# Patient Record
Sex: MALE | Race: ASIAN | NOT HISPANIC OR LATINO | URBAN - METROPOLITAN AREA
[De-identification: names, ages, dates, MRNs, and addresses within clinical notes are randomized per-mention and may not be internally consistent; named-entity substitution may affect disease eponyms.]

---

## 2020-12-30 ENCOUNTER — INPATIENT (INPATIENT)
Facility: HOSPITAL | Age: 40
LOS: 0 days | Discharge: HOME | DRG: 863 | End: 2020-12-31
Attending: ORTHOPAEDIC SURGERY | Admitting: ORTHOPAEDIC SURGERY
Payer: COMMERCIAL

## 2020-12-30 VITALS
HEART RATE: 79 BPM | RESPIRATION RATE: 16 BRPM | DIASTOLIC BLOOD PRESSURE: 91 MMHG | OXYGEN SATURATION: 100 % | TEMPERATURE: 98 F | SYSTOLIC BLOOD PRESSURE: 140 MMHG | WEIGHT: 167.99 LBS

## 2020-12-30 DIAGNOSIS — T81.9XXA UNSPECIFIED COMPLICATION OF PROCEDURE, INITIAL ENCOUNTER: ICD-10-CM

## 2020-12-30 LAB
ALBUMIN SERPL ELPH-MCNC: 4.1 G/DL — SIGNIFICANT CHANGE UP (ref 3.3–5)
ALP SERPL-CCNC: 72 U/L — SIGNIFICANT CHANGE UP (ref 40–120)
ALT FLD-CCNC: 57 U/L — HIGH (ref 10–45)
ANION GAP SERPL CALC-SCNC: 9 MMOL/L — SIGNIFICANT CHANGE UP (ref 5–17)
APTT BLD: 33.9 SEC — SIGNIFICANT CHANGE UP (ref 27.5–35.5)
AST SERPL-CCNC: 46 U/L — HIGH (ref 10–40)
BASOPHILS # BLD AUTO: 0.04 K/UL — SIGNIFICANT CHANGE UP (ref 0–0.2)
BASOPHILS NFR BLD AUTO: 0.6 % — SIGNIFICANT CHANGE UP (ref 0–2)
BILIRUB SERPL-MCNC: 0.3 MG/DL — SIGNIFICANT CHANGE UP (ref 0.2–1.2)
BUN SERPL-MCNC: 13 MG/DL — SIGNIFICANT CHANGE UP (ref 7–23)
CALCIUM SERPL-MCNC: 9.4 MG/DL — SIGNIFICANT CHANGE UP (ref 8.4–10.5)
CHLORIDE SERPL-SCNC: 102 MMOL/L — SIGNIFICANT CHANGE UP (ref 96–108)
CO2 SERPL-SCNC: 29 MMOL/L — SIGNIFICANT CHANGE UP (ref 22–31)
CREAT SERPL-MCNC: 1 MG/DL — SIGNIFICANT CHANGE UP (ref 0.5–1.3)
EOSINOPHIL # BLD AUTO: 0.08 K/UL — SIGNIFICANT CHANGE UP (ref 0–0.5)
EOSINOPHIL NFR BLD AUTO: 1.2 % — SIGNIFICANT CHANGE UP (ref 0–6)
GLUCOSE SERPL-MCNC: 98 MG/DL — SIGNIFICANT CHANGE UP (ref 70–99)
HCT VFR BLD CALC: 42.2 % — SIGNIFICANT CHANGE UP (ref 39–50)
HGB BLD-MCNC: 13.5 G/DL — SIGNIFICANT CHANGE UP (ref 13–17)
IMM GRANULOCYTES NFR BLD AUTO: 0.9 % — SIGNIFICANT CHANGE UP (ref 0–1.5)
INR BLD: 1.04 — SIGNIFICANT CHANGE UP (ref 0.88–1.16)
LACTATE SERPL-SCNC: 2.1 MMOL/L — HIGH (ref 0.5–2)
LYMPHOCYTES # BLD AUTO: 1.98 K/UL — SIGNIFICANT CHANGE UP (ref 1–3.3)
LYMPHOCYTES # BLD AUTO: 29 % — SIGNIFICANT CHANGE UP (ref 13–44)
MCHC RBC-ENTMCNC: 27.8 PG — SIGNIFICANT CHANGE UP (ref 27–34)
MCHC RBC-ENTMCNC: 32 GM/DL — SIGNIFICANT CHANGE UP (ref 32–36)
MCV RBC AUTO: 86.8 FL — SIGNIFICANT CHANGE UP (ref 80–100)
MONOCYTES # BLD AUTO: 0.49 K/UL — SIGNIFICANT CHANGE UP (ref 0–0.9)
MONOCYTES NFR BLD AUTO: 7.2 % — SIGNIFICANT CHANGE UP (ref 2–14)
NEUTROPHILS # BLD AUTO: 4.18 K/UL — SIGNIFICANT CHANGE UP (ref 1.8–7.4)
NEUTROPHILS NFR BLD AUTO: 61.1 % — SIGNIFICANT CHANGE UP (ref 43–77)
NRBC # BLD: 0 /100 WBCS — SIGNIFICANT CHANGE UP (ref 0–0)
PLATELET # BLD AUTO: 286 K/UL — SIGNIFICANT CHANGE UP (ref 150–400)
POTASSIUM SERPL-MCNC: 4.6 MMOL/L — SIGNIFICANT CHANGE UP (ref 3.5–5.3)
POTASSIUM SERPL-SCNC: 4.6 MMOL/L — SIGNIFICANT CHANGE UP (ref 3.5–5.3)
PROT SERPL-MCNC: 7.6 G/DL — SIGNIFICANT CHANGE UP (ref 6–8.3)
PROTHROM AB SERPL-ACNC: 12.5 SEC — SIGNIFICANT CHANGE UP (ref 10.6–13.6)
RBC # BLD: 4.86 M/UL — SIGNIFICANT CHANGE UP (ref 4.2–5.8)
RBC # FLD: 13.2 % — SIGNIFICANT CHANGE UP (ref 10.3–14.5)
SARS-COV-2 RNA SPEC QL NAA+PROBE: SIGNIFICANT CHANGE UP
SODIUM SERPL-SCNC: 140 MMOL/L — SIGNIFICANT CHANGE UP (ref 135–145)
WBC # BLD: 6.83 K/UL — SIGNIFICANT CHANGE UP (ref 3.8–10.5)
WBC # FLD AUTO: 6.83 K/UL — SIGNIFICANT CHANGE UP (ref 3.8–10.5)

## 2020-12-30 PROCEDURE — 99222 1ST HOSP IP/OBS MODERATE 55: CPT

## 2020-12-30 PROCEDURE — 99254 IP/OBS CNSLTJ NEW/EST MOD 60: CPT

## 2020-12-30 PROCEDURE — 99285 EMERGENCY DEPT VISIT HI MDM: CPT

## 2020-12-30 PROCEDURE — 99221 1ST HOSP IP/OBS SF/LOW 40: CPT

## 2020-12-30 RX ORDER — CEFAZOLIN SODIUM 1 G
2000 VIAL (EA) INJECTION EVERY 8 HOURS
Refills: 0 | Status: DISCONTINUED | OUTPATIENT
Start: 2020-12-30 | End: 2020-12-31

## 2020-12-30 RX ORDER — ACETAMINOPHEN 500 MG
650 TABLET ORAL EVERY 6 HOURS
Refills: 0 | Status: DISCONTINUED | OUTPATIENT
Start: 2020-12-30 | End: 2020-12-31

## 2020-12-30 RX ORDER — SENNA PLUS 8.6 MG/1
2 TABLET ORAL AT BEDTIME
Refills: 0 | Status: DISCONTINUED | OUTPATIENT
Start: 2020-12-30 | End: 2020-12-31

## 2020-12-30 RX ORDER — CEFAZOLIN SODIUM 1 G
2000 VIAL (EA) INJECTION ONCE
Refills: 0 | Status: COMPLETED | OUTPATIENT
Start: 2020-12-30 | End: 2020-12-30

## 2020-12-30 RX ORDER — POLYETHYLENE GLYCOL 3350 17 G/17G
17 POWDER, FOR SOLUTION ORAL DAILY
Refills: 0 | Status: DISCONTINUED | OUTPATIENT
Start: 2020-12-30 | End: 2020-12-31

## 2020-12-30 RX ORDER — CEFAZOLIN SODIUM 1 G
VIAL (EA) INJECTION
Refills: 0 | Status: DISCONTINUED | OUTPATIENT
Start: 2020-12-30 | End: 2020-12-31

## 2020-12-30 RX ORDER — SODIUM CHLORIDE 9 MG/ML
1000 INJECTION INTRAMUSCULAR; INTRAVENOUS; SUBCUTANEOUS ONCE
Refills: 0 | Status: COMPLETED | OUTPATIENT
Start: 2020-12-30 | End: 2020-12-30

## 2020-12-30 RX ORDER — OXYCODONE AND ACETAMINOPHEN 5; 325 MG/1; MG/1
1 TABLET ORAL EVERY 4 HOURS
Refills: 0 | Status: DISCONTINUED | OUTPATIENT
Start: 2020-12-30 | End: 2020-12-31

## 2020-12-30 RX ORDER — INFLUENZA VIRUS VACCINE 15; 15; 15; 15 UG/.5ML; UG/.5ML; UG/.5ML; UG/.5ML
0.5 SUSPENSION INTRAMUSCULAR ONCE
Refills: 0 | Status: DISCONTINUED | OUTPATIENT
Start: 2020-12-30 | End: 2020-12-31

## 2020-12-30 RX ADMIN — OXYCODONE AND ACETAMINOPHEN 1 TABLET(S): 5; 325 TABLET ORAL at 22:27

## 2020-12-30 RX ADMIN — OXYCODONE AND ACETAMINOPHEN 1 TABLET(S): 5; 325 TABLET ORAL at 21:27

## 2020-12-30 RX ADMIN — Medication 100 MILLIGRAM(S): at 18:10

## 2020-12-30 RX ADMIN — SODIUM CHLORIDE 1000 MILLILITER(S): 9 INJECTION INTRAMUSCULAR; INTRAVENOUS; SUBCUTANEOUS at 15:10

## 2020-12-30 NOTE — CONSULT NOTE ADULT - ASSESSMENT
41 yo male with hx L biceps tendon rupture s/p repair 8/2020, re-op 9/2020, then with development of LUE redness and swelling and pain earlier this month, found to have wound dehiscence c/w SSI s/p washout 12/23/20 (OR cx with MSSA).  - check baseline ESR and CRP  - start cefazolin 2g IV q8h while in house; anticipate transition back to PO cephalexin upon discharge    ID Team 2

## 2020-12-30 NOTE — ED PROVIDER NOTE - CLINICAL SUMMARY MEDICAL DECISION MAKING FREE TEXT BOX
pt had biceps surg that was complicated by wound dehiscence and abscess formation - I&D done and cultures growing bacteria (ortho team has sensitivity report), wound vac in place, plan is to admit to ortho service - no abx to be given in ED, admission labs sent

## 2020-12-30 NOTE — H&P ADULT - NSHPPHYSICALEXAM_GEN_ALL_CORE
PE:   Alert, oriented, comfortable on stretcher. Pleasant. Cooperative.  Left shoulder ANMOL dressing in place and maintaining suction. No drainage. No warmth or erythema.   Did not test left shoulder ROM  or motor function due to  surgery today and anesthesia block in place.   Decreased sensation to LUE due to block from surgery  Skin warm and well perfused. cap refill brisk. Radial pulses palpable

## 2020-12-30 NOTE — H&P ADULT - NSHPLABSRESULTS_GEN_ALL_CORE
Will add on ESR, CRP to labs from ER per ID recs  +MSSA Preliminary cultures from outside hospital OR

## 2020-12-30 NOTE — H&P ADULT - HISTORY OF PRESENT ILLNESS
40M with no PMHx presents to ER after revision biceps tenodesis and I&D yesterday. Pt had biceps tendon rupture with repair in August 2020 with a revision surgery after re-injury in September 2020. Pt had wound dehiscencse with redness and swelling to E which was treated with wash out 1 week ago. He has been taking Cefalexin since surgery and presented for another wash out today where he was found to have continued wound dehiscence. Pt was sent to ER after surgery for formal antibiotic recs as last week's cultures are growing Staph Aureus.   Denies fever, chills, increased pain, HA, diaphoresis, CP, SOB, recent illness. No issues with antibiotic.

## 2020-12-30 NOTE — H&P ADULT - PROBLEM SELECTOR PLAN 1
Admit to Orthopaedic Service.  ID consulted for ID recs given first I&D results show +MSSA on prelim report  Appreciate ID recs, will start 2gm IV Cefalexin q8 then transition to oral Keflex upon discharge  ESR, CRP added on  Returned labs reviewed  Pain control  Continue sling  OOB as tolerated   Discussed with Dr. Posadas who agrees with plan

## 2020-12-30 NOTE — ED PROVIDER NOTE - ATTENDING CONTRIBUTION TO CARE
39 yo m presents to ED at orthopedist request for admission secondary to concern for post operative infection.  He has wound vac in place to left bicep following bicep/shoulder soft tissue repair.  Ortho requesting admission without abx as they would like to consult with ID prior to abx administration.  He is not on outpatient abx.  On exam, patient is non toxic.  HRRR, lungs clear.  Wound vac in place to LUE without drainage in collecting system.  Will obtain basic labs, admit to ortho.

## 2020-12-30 NOTE — CONSULT NOTE ADULT - SUBJECTIVE AND OBJECTIVE BOX
HPI: 41 yo male with hx L biceps tendon rupture s/p repair 8/2020, re-op 9/2020, then with development of LUE redness and swelling and pain earlier this month, found to have wound dehiscence c/w SSI s/p washout 12/23/20 (OR cx with MSSA); he has been on cephalexin (thrice daily) since. Today underwent wound vac placement; additional cultures were sent. He overall feels better in the last week. No issues with antibiotic. No fever or chills.        PAST MEDICAL & SURGICAL HISTORY:        REVIEW OF SYSTEMS:    General:	 no weakness; no fevers, no chills  Skin/Breast: no rash  Respiratory and Thorax: no SOB, no cough  Cardiovascular:	No chest pain  Gastrointestinal:	 no nausea, vomiting , diarrhea  Genitourinary:	no dysuria, no difficulty urinating, no hematuria  Musculoskeletal:	no weakness, no joint swelling/pain  Neurological:	no focal weakness/numbness  Endocrine:	no polyuria, no polydipsia      ANTIBIOTICS:  MEDICATIONS  (STANDING):  influenza   Vaccine 0.5 milliLiter(s) IntraMuscular once  senna 2 Tablet(s) Oral at bedtime    MEDICATIONS  (PRN):  acetaminophen   Tablet .. 650 milliGRAM(s) Oral every 6 hours PRN Temp greater or equal to 38C (100.4F), Mild Pain (1 - 3)  oxycodone    5 mG/acetaminophen 325 mG 1 Tablet(s) Oral every 4 hours PRN Severe Pain (7 - 10)  polyethylene glycol 3350 17 Gram(s) Oral daily PRN Constipation      Allergies    No Known Allergies    Intolerances        SOCIAL HISTORY:    FAMILY HISTORY:      Vital Signs Last 24 Hrs  T(C): 36.6 (30 Dec 2020 15:03), Max: 36.7 (30 Dec 2020 13:45)  T(F): 97.9 (30 Dec 2020 15:03), Max: 98.1 (30 Dec 2020 13:45)  HR: 68 (30 Dec 2020 15:03) (68 - 79)  BP: 127/86 (30 Dec 2020 15:03) (127/86 - 140/91)  BP(mean): --  RR: 18 (30 Dec 2020 15:03) (16 - 18)  SpO2: 99% (30 Dec 2020 15:03) (99% - 100%)    PHYSICAL EXAM:  Constitutional:Well-developed, well nourished  Eyes:GAVINO, EOMI  Ear/Nose/Throat: no oral lesion, no sinus tenderness on percussion	  Neck:no JVD, no lymphadenopathy, supple  Respiratory: CTA matt  Cardiovascular: S1S2 RRR, no murmurs  Gastrointestinal:soft, (+) BS, no HSM  Extremities: L biceps dressing; LUE sling   Vascular: DP Pulse:	right normal; left normal            LABS:                        13.5   6.83  )-----------( 286      ( 30 Dec 2020 14:18 )             42.2     12-30    140  |  102  |  13  ----------------------------<  98  4.6   |  29  |  1.00    Ca    9.4      30 Dec 2020 14:18    TPro  7.6  /  Alb  4.1  /  TBili  0.3  /  DBili  x   /  AST  46<H>  /  ALT  57<H>  /  AlkPhos  72  12-30    PT/INR - ( 30 Dec 2020 14:18 )   PT: 12.5 sec;   INR: 1.04          PTT - ( 30 Dec 2020 14:18 )  PTT:33.9 sec      MICROBIOLOGY: outside culture of LUE wound MSSA  RADIOLOGY & ADDITIONAL STUDIES: none

## 2020-12-30 NOTE — H&P ADULT - ASSESSMENT
40M with surgical site infection s/p revision biceps tenodesis left shoulder x 2 now s/p 2 shoulder wash outs with preliminary cultures growing +MSSA

## 2020-12-30 NOTE — ED ADULT NURSE NOTE - OBJECTIVE STATEMENT
Patient is a 40y male sent in by surgeon for post op infection. Pt had surgery on left bicep one hour ago. Pt states, " I had surgery on my shoulder and bicep in August and September. I had a surgery on the bicep last week and today. I was sent in because they said I have a wound infection." Pt endorses the cyst being drained last week in surgery and the culture from that surgery showed infection. Pt reports taking oxycodone and cephalexin since surgery last week. Pt states, "My surgery today was just to close and clean the wound." No significant PMH. Original shoulder injury was from lifting weights. Pt denies any pain at this time. Pt denies chest pain, SOB, N/V, dizziness, fever, chills. Strong peripheral pulses. Capillary refill<2 seconds bilaterally. Neuro sensation intact bilaterally. ANMOL drain at surgical site. Respirations even and unlabored. Patient resting comfortably. No acute distress noted at this time.

## 2020-12-30 NOTE — ED PROVIDER NOTE - OBJECTIVE STATEMENT
The pt is a 41 y/o M, who presents to ED c/o complication s/p L shoulder surg - states that had I&D done last wk and wound culture grew out some bacteria, today was seen by ortho and wound vac was placed but told that needs to be admitted for abx. Denies fevers, chills, numbness to fingers, cp, sob, cough, n/v/d.

## 2020-12-30 NOTE — ED ADULT TRIAGE NOTE - CHIEF COMPLAINT QUOTE
Pt sent in by surgeon s/p  for post-op infection to left michelle. Denies fevers, chills. Left arm in sling per surgical team.

## 2020-12-30 NOTE — ED PROVIDER NOTE - MUSCULOSKELETAL, MLM
Spine appears normal, range of motion is not limited, no muscle or joint tenderness; L UE w/sling in place, wound vac in place w/dressing in place, no local erythema or tend, +light touch, soft compartments

## 2020-12-31 ENCOUNTER — TRANSCRIPTION ENCOUNTER (OUTPATIENT)
Age: 40
End: 2020-12-31

## 2020-12-31 VITALS
SYSTOLIC BLOOD PRESSURE: 120 MMHG | HEART RATE: 64 BPM | OXYGEN SATURATION: 97 % | RESPIRATION RATE: 16 BRPM | TEMPERATURE: 98 F | DIASTOLIC BLOOD PRESSURE: 73 MMHG

## 2020-12-31 LAB
ANION GAP SERPL CALC-SCNC: 7 MMOL/L — SIGNIFICANT CHANGE UP (ref 5–17)
BUN SERPL-MCNC: 15 MG/DL — SIGNIFICANT CHANGE UP (ref 7–23)
CALCIUM SERPL-MCNC: 8.7 MG/DL — SIGNIFICANT CHANGE UP (ref 8.4–10.5)
CHLORIDE SERPL-SCNC: 104 MMOL/L — SIGNIFICANT CHANGE UP (ref 96–108)
CO2 SERPL-SCNC: 29 MMOL/L — SIGNIFICANT CHANGE UP (ref 22–31)
CREAT SERPL-MCNC: 1.19 MG/DL — SIGNIFICANT CHANGE UP (ref 0.5–1.3)
GLUCOSE SERPL-MCNC: 100 MG/DL — HIGH (ref 70–99)
HCT VFR BLD CALC: 37.5 % — LOW (ref 39–50)
HGB BLD-MCNC: 11.5 G/DL — LOW (ref 13–17)
MCHC RBC-ENTMCNC: 26.7 PG — LOW (ref 27–34)
MCHC RBC-ENTMCNC: 30.7 GM/DL — LOW (ref 32–36)
MCV RBC AUTO: 87.2 FL — SIGNIFICANT CHANGE UP (ref 80–100)
NRBC # BLD: 0 /100 WBCS — SIGNIFICANT CHANGE UP (ref 0–0)
PLATELET # BLD AUTO: 260 K/UL — SIGNIFICANT CHANGE UP (ref 150–400)
POTASSIUM SERPL-MCNC: 4.5 MMOL/L — SIGNIFICANT CHANGE UP (ref 3.5–5.3)
POTASSIUM SERPL-SCNC: 4.5 MMOL/L — SIGNIFICANT CHANGE UP (ref 3.5–5.3)
RBC # BLD: 4.3 M/UL — SIGNIFICANT CHANGE UP (ref 4.2–5.8)
RBC # FLD: 13.2 % — SIGNIFICANT CHANGE UP (ref 10.3–14.5)
SODIUM SERPL-SCNC: 140 MMOL/L — SIGNIFICANT CHANGE UP (ref 135–145)
WBC # BLD: 6.84 K/UL — SIGNIFICANT CHANGE UP (ref 3.8–10.5)
WBC # FLD AUTO: 6.84 K/UL — SIGNIFICANT CHANGE UP (ref 3.8–10.5)

## 2020-12-31 PROCEDURE — 86140 C-REACTIVE PROTEIN: CPT

## 2020-12-31 PROCEDURE — U0003: CPT

## 2020-12-31 PROCEDURE — 80053 COMPREHEN METABOLIC PANEL: CPT

## 2020-12-31 PROCEDURE — 99285 EMERGENCY DEPT VISIT HI MDM: CPT

## 2020-12-31 PROCEDURE — 85730 THROMBOPLASTIN TIME PARTIAL: CPT

## 2020-12-31 PROCEDURE — 36415 COLL VENOUS BLD VENIPUNCTURE: CPT

## 2020-12-31 PROCEDURE — 85027 COMPLETE CBC AUTOMATED: CPT

## 2020-12-31 PROCEDURE — 85610 PROTHROMBIN TIME: CPT

## 2020-12-31 PROCEDURE — 85652 RBC SED RATE AUTOMATED: CPT

## 2020-12-31 PROCEDURE — 87040 BLOOD CULTURE FOR BACTERIA: CPT

## 2020-12-31 PROCEDURE — 80048 BASIC METABOLIC PNL TOTAL CA: CPT

## 2020-12-31 PROCEDURE — 85025 COMPLETE CBC W/AUTO DIFF WBC: CPT

## 2020-12-31 PROCEDURE — G0378: CPT

## 2020-12-31 PROCEDURE — 99232 SBSQ HOSP IP/OBS MODERATE 35: CPT

## 2020-12-31 PROCEDURE — 83605 ASSAY OF LACTIC ACID: CPT

## 2020-12-31 RX ORDER — ACETAMINOPHEN 500 MG
2 TABLET ORAL
Qty: 0 | Refills: 0 | DISCHARGE
Start: 2020-12-31

## 2020-12-31 RX ORDER — SENNA PLUS 8.6 MG/1
2 TABLET ORAL
Qty: 0 | Refills: 0 | DISCHARGE
Start: 2020-12-31

## 2020-12-31 RX ORDER — CEPHALEXIN 500 MG
1 CAPSULE ORAL
Qty: 52 | Refills: 0
Start: 2020-12-31 | End: 2021-01-12

## 2020-12-31 RX ADMIN — Medication 100 MILLIGRAM(S): at 00:55

## 2020-12-31 RX ADMIN — OXYCODONE AND ACETAMINOPHEN 1 TABLET(S): 5; 325 TABLET ORAL at 07:50

## 2020-12-31 RX ADMIN — OXYCODONE AND ACETAMINOPHEN 1 TABLET(S): 5; 325 TABLET ORAL at 08:50

## 2020-12-31 RX ADMIN — Medication 100 MILLIGRAM(S): at 08:24

## 2020-12-31 RX ADMIN — OXYCODONE AND ACETAMINOPHEN 1 TABLET(S): 5; 325 TABLET ORAL at 02:27

## 2020-12-31 RX ADMIN — OXYCODONE AND ACETAMINOPHEN 1 TABLET(S): 5; 325 TABLET ORAL at 01:27

## 2020-12-31 NOTE — DISCHARGE NOTE PROVIDER - HOSPITAL COURSE
Admit to Orthopedics from ER, post op day 1 left biceps wash out  Periop Antibx, ID consult   Cultures from 12/23/20 show +MSSA   OOB, ambulate as tolerated  Pain mgt

## 2020-12-31 NOTE — DISCHARGE NOTE PROVIDER - NSDCCPCAREPLAN_GEN_ALL_CORE_FT
PRINCIPAL DISCHARGE DIAGNOSIS  Diagnosis: Postoperative complication  Assessment and Plan of Treatment:

## 2020-12-31 NOTE — PROGRESS NOTE ADULT - ASSESSMENT
39 yo male with hx L biceps tendon rupture s/p repair 8/2020, re-op 9/2020, then with development of LUE redness and swelling and pain earlier this month, found to have wound dehiscence c/w SSI s/p washout 12/23/20 (OR cx with MSSA). Can transition from cefazolin back to cephalexin (dosed 500mg PO q6h) through 1/12/21. Will arrange post hospital f/u with me in 2 weeks at which point can repeat inflammatory markers to ensure interval improvement.  Please reconsult with ?  ID Team 2

## 2020-12-31 NOTE — DISCHARGE NOTE PROVIDER - PROVIDER TOKENS
PROVIDER:[TOKEN:[[id number]],FOLLOWUP:[1 week]],PROVIDER:[TOKEN:[[id number]],FOLLOWUP:[1 week]]

## 2020-12-31 NOTE — DISCHARGE NOTE PROVIDER - NSDCFUADDINST_GEN_ALL_CORE_FT
Take antibiotics for 13 days until prescription is gone. Follow up with Dr. Hurtado/Cuauhtemoc.   You have pain medication at home to take as needed.   No strenuous activity, heavy lifting, driving or returning to work until cleared by MD.  You may shower - dressing is water-resistant, no soaking in bathtubs.  Remove dressing after battery dies on post op day 5-7, then leave incision open to air. Keep incision clean and dry.  Try to have regular bowel movements, take stool softener or laxative if necessary.  May take Pepcid or Zantac for upset stomach.  Swelling may travel all the way down leg to foot, this is normal and will subside in a few weeks.  Call to schedule an appt with Dr. Hurtado/Dr. Posadas for follow up, if you have staples or sutures they will be removed in office.  Contact your doctor if you experience: fever greater than 101.5, chills, chest pain, difficulty breathing, redness or excessive drainage around the incision, other concerns.

## 2020-12-31 NOTE — DISCHARGE NOTE PROVIDER - CARE PROVIDERS DIRECT ADDRESSES
,DirectAddress_Unknown,[email or web address]

## 2020-12-31 NOTE — PROGRESS NOTE ADULT - SUBJECTIVE AND OBJECTIVE BOX
Ortho Note    Pt comfortable without complaints, pain controlled  Denies CP, SOB, N/V, numbness/tingling     Vital Signs Last 24 Hrs  T(C): 36.8 (12-30-20 @ 21:47), Max: 36.8 (12-30-20 @ 21:47)  T(F): 98.3 (12-30-20 @ 21:47), Max: 98.3 (12-30-20 @ 21:47)  HR: 73 (12-30-20 @ 21:47) (73 - 73)  BP: 135/78 (12-30-20 @ 21:47) (135/78 - 135/78)  BP(mean): --  RR: 17 (12-30-20 @ 21:47) (17 - 17)  SpO2: 99% (12-30-20 @ 21:47) (99% - 99%)  AVSS    General: Pt Alert and oriented, NAD  L arm kash DSG C/D/I  Kash functional   Sensation intact C5-T1  Axillary/AIN/PIN/U grossly preserved   2+ rad pulse   Fingers WWP                          13.5   6.83  )-----------( 286      ( 30 Dec 2020 14:18 )             42.2   30 Dec 2020 14:18    140    |  102    |  13     ----------------------------<  98     4.6     |  29     |  1.00       TPro  7.6    /  Alb  4.1    /  TBili  0.3    /  DBili  x      /  AST  46     /  ALT  57     /  AlkPhos  72     30 Dec 2020 14:18      A/P: 40M s/p revision biceps tenodesis left shoulder x 2 now s/p 2 shoulder wash out 12/30 with preliminary cultures growing +MSSA admitted for IV abx  - Stable  - Pain Control  - DVT ppx: SCDs  - PT, WBS: WBAT in sling   - Appreciate ID recs - continue ancef and plan to transition yennifer PO upon discharge   - f/u cx  - Dispo planning     Ortho Pager [phone]
INTERVAL HPI/OVERNIGHT EVENTS: Feels well. No fevers. Going home today.    CONSTITUTIONAL:  Negative fever or chills, feels well, good appetite  EYES:  Negative  blurry vision or double vision  CARDIOVASCULAR:  Negative for chest pain or palpitations  RESPIRATORY:  Negative for cough, wheezing, or SOB   GASTROINTESTINAL:  Negative for nausea, vomiting, diarrhea, constipation, or abdominal pain  GENITOURINARY:  Negative frequency, urgency or dysuria  NEUROLOGIC:  No headache, confusion, dizziness, lightheadedness      ANTIBIOTICS/RELEVANT:    MEDICATIONS  (STANDING):  ceFAZolin   IVPB      ceFAZolin   IVPB 2000 milliGRAM(s) IV Intermittent every 8 hours  influenza   Vaccine 0.5 milliLiter(s) IntraMuscular once  senna 2 Tablet(s) Oral at bedtime    MEDICATIONS  (PRN):  acetaminophen   Tablet .. 650 milliGRAM(s) Oral every 6 hours PRN Temp greater or equal to 38C (100.4F), Mild Pain (1 - 3)  oxycodone    5 mG/acetaminophen 325 mG 1 Tablet(s) Oral every 4 hours PRN Severe Pain (7 - 10)  polyethylene glycol 3350 17 Gram(s) Oral daily PRN Constipation        Vital Signs Last 24 Hrs  T(C): 36.6 (31 Dec 2020 08:23), Max: 36.8 (30 Dec 2020 21:47)  T(F): 97.8 (31 Dec 2020 08:23), Max: 98.3 (30 Dec 2020 21:47)  HR: 64 (31 Dec 2020 08:23) (64 - 73)  BP: 120/73 (31 Dec 2020 08:23) (116/76 - 135/78)  BP(mean): --  RR: 16 (31 Dec 2020 08:23) (16 - 18)  SpO2: 97% (31 Dec 2020 08:23) (97% - 99%)    PHYSICAL EXAM:  Constitutional:Well-developed, well nourished  Eyes:GAVINO, EOMI  Ear/Nose/Throat: no oral lesion, no sinus tenderness on percussion	  Neck:no JVD, no lymphadenopathy, supple  Respiratory: CTA matt  Cardiovascular: S1S2 RRR, no murmurs  Gastrointestinal:soft, (+) BS, no HSM  Extremities:no e/e/c  Vascular: DP Pulse:	right normal; left normal      LABS:                        11.5   6.84  )-----------( 260      ( 31 Dec 2020 07:44 )             37.5     12-31    140  |  104  |  15  ----------------------------<  100<H>  4.5   |  29  |  1.19    Ca    8.7      31 Dec 2020 07:44    TPro  7.6  /  Alb  4.1  /  TBili  0.3  /  DBili  x   /  AST  46<H>  /  ALT  57<H>  /  AlkPhos  72  12-30    PT/INR - ( 30 Dec 2020 14:18 )   PT: 12.5 sec;   INR: 1.04          PTT - ( 30 Dec 2020 14:18 )  PTT:33.9 sec      MICROBIOLOGY: reviewed    RADIOLOGY & ADDITIONAL STUDIES: reviewed

## 2020-12-31 NOTE — DISCHARGE NOTE PROVIDER - NSDCMRMEDTOKEN_GEN_ALL_CORE_FT
acetaminophen 325 mg oral tablet: 2 tab(s) orally every 6 hours, As needed, Temp greater or equal to 38C (100.4F), Mild Pain (1 - 3)  cephalexin 500 mg oral tablet: 1 tab(s) orally every 6 hours for 13 days.   oxycodone-acetaminophen 5 mg-325 mg oral tablet: 1 tab(s) orally every 4 hours, As needed, Severe Pain (7 - 10)  senna oral tablet: 2 tab(s) orally once a day (at bedtime)

## 2020-12-31 NOTE — DISCHARGE NOTE NURSING/CASE MANAGEMENT/SOCIAL WORK - PATIENT PORTAL LINK FT
You can access the FollowMyHealth Patient Portal offered by [facility] by registering at the following website: [email or web address]. By joining [employer]’s FollowMyHealth portal, you will also be able to view your health information using other applications (apps) compatible with our system.

## 2020-12-31 NOTE — DISCHARGE NOTE PROVIDER - CARE PROVIDER_API CALL
Kirill Hurtado)  Orthopaedic Surgery; Sports Medicine  [address] [place] [address]  Phone: [phone]  Fax: [phone])-  Follow Up Time: 1 week    Cruz Estevez  ORTHOPAEDIC SURGERY  [address] [address]  Phone: [phone]  Fax: [phone]  Follow Up Time: 1 week
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
## 2021-01-02 DIAGNOSIS — T81.31XA DISRUPTION OF EXTERNAL OPERATION (SURGICAL) WOUND, NOT ELSEWHERE CLASSIFIED, INITIAL ENCOUNTER: ICD-10-CM

## 2021-01-02 DIAGNOSIS — T81.40XA INFECTION FOLLOWING A PROCEDURE, UNSPECIFIED, INITIAL ENCOUNTER: ICD-10-CM

## 2021-01-02 DIAGNOSIS — Z79.2 LONG TERM (CURRENT) USE OF ANTIBIOTICS: ICD-10-CM

## 2021-01-02 DIAGNOSIS — B95.61 METHICILLIN SUSCEPTIBLE STAPHYLOCOCCUS AUREUS INFECTION AS THE CAUSE OF DISEASES CLASSIFIED ELSEWHERE: ICD-10-CM

## 2021-01-04 LAB
CULTURE RESULTS: SIGNIFICANT CHANGE UP
CULTURE RESULTS: SIGNIFICANT CHANGE UP
SPECIMEN SOURCE: SIGNIFICANT CHANGE UP
SPECIMEN SOURCE: SIGNIFICANT CHANGE UP

## 2021-01-07 PROBLEM — Z00.00 ENCOUNTER FOR PREVENTIVE HEALTH EXAMINATION: Status: ACTIVE | Noted: 2021-01-07

## 2021-01-14 ENCOUNTER — APPOINTMENT (OUTPATIENT)
Dept: INFECTIOUS DISEASE | Facility: CLINIC | Age: 41
End: 2021-01-14

## 2023-07-06 NOTE — PATIENT PROFILE ADULT - NSPRESCRALCAMT_GEN_A_NUR
1 or 2 Tremfya Counseling: I discussed with the patient the risks of guselkumab including but not limited to immunosuppression, serious infections, and drug reactions.  The patient understands that monitoring is required including a PPD at baseline and must alert us or the primary physician if symptoms of infection or other concerning signs are noted.
